# Patient Record
Sex: MALE
[De-identification: names, ages, dates, MRNs, and addresses within clinical notes are randomized per-mention and may not be internally consistent; named-entity substitution may affect disease eponyms.]

---

## 2023-07-26 ENCOUNTER — DASHBOARD ENCOUNTERS (OUTPATIENT)
Age: 88
End: 2023-07-26

## 2023-07-26 ENCOUNTER — OFFICE VISIT (OUTPATIENT)
Dept: URBAN - METROPOLITAN AREA CLINIC 115 | Facility: CLINIC | Age: 88
End: 2023-07-26
Payer: MEDICARE

## 2023-07-26 VITALS
HEART RATE: 62 BPM | DIASTOLIC BLOOD PRESSURE: 80 MMHG | SYSTOLIC BLOOD PRESSURE: 134 MMHG | TEMPERATURE: 99.1 F | WEIGHT: 184.2 LBS | HEIGHT: 72 IN | BODY MASS INDEX: 24.95 KG/M2

## 2023-07-26 DIAGNOSIS — Z80.0 FAMILY HISTORY OF RECTAL CANCER: ICD-10-CM

## 2023-07-26 DIAGNOSIS — R19.7 ACUTE DIARRHEA: ICD-10-CM

## 2023-07-26 PROBLEM — 62315008: Status: ACTIVE | Noted: 2023-07-26

## 2023-07-26 PROBLEM — 456441000124100: Status: ACTIVE | Noted: 2023-07-26

## 2023-07-26 PROCEDURE — 99244 OFF/OP CNSLTJ NEW/EST MOD 40: CPT | Performed by: INTERNAL MEDICINE

## 2023-07-26 PROCEDURE — 99204 OFFICE O/P NEW MOD 45 MIN: CPT | Performed by: INTERNAL MEDICINE

## 2023-07-26 RX ORDER — OXCARBAZEPINE 300 MG/1
1 TABLET TABLET, FILM COATED ORAL TWICE A DAY
Status: ACTIVE | COMMUNITY

## 2023-07-26 RX ORDER — LAMOTRIGINE 25 MG/1
1 TABLET TABLET ORAL
Status: ACTIVE | COMMUNITY

## 2023-07-26 RX ORDER — LISINOPRIL 40 MG/1
1 TABLET TABLET ORAL ONCE A DAY
Status: ACTIVE | COMMUNITY

## 2023-07-26 NOTE — HPI-TODAY'S VISIT:
RECENT LABS 5/22/23 MILD ANEMIA. BUN /CREAT NORMAL  SHCEDULED HERNIA SURGERY, LEFT INGUINAL , WENT FOR PREP OP HAD HTN, POSTPONED. STARTED ON LISINOPRIL,  DIARRHEA, MIXED WITH LIQUID, 2 MONTHS AGO, THIS MORNING IS REGULARE BM  H/O SEVERE DIARRHEA, ON JULY 7 TH.  ANUS DILATED , AND SEE TISSUES.   LAST COLONSOCOPY 1998.  NO ANTIBIOTICS IN LAST 3 MONTHS,  BP MEDICATION STARTED ON SECOND OF MICHELLE. , LOOSE BM EVEN BEFORE.   GOING TO HAVE BM , AND FEEL LIKE ALL OF [place]UDDEN CANT DO IT.   15 -20 EXPLODES  NO PAIN, NO BLEEDING.

## 2023-07-30 LAB
ADENOVIRUS F 40/41: NOT DETECTED
CAMPYLOBACTER: NOT DETECTED
CLOSTRIDIUM DIFFICILE: NOT DETECTED
ENTAMOEBA HISTOLYTICA: NOT DETECTED
ENTEROAGGREGATIVE E.COLI: NOT DETECTED
ENTEROTOXIGENIC E.COLI: NOT DETECTED
ESCHERICHIA COLI O157: NOT DETECTED
GIARDIA LAMBLIA: NOT DETECTED
NOROVIRUS GI/GII: NOT DETECTED
ROTAVIRUS A: NOT DETECTED
SALMONELLA SPP.: NOT DETECTED
SHIGA-LIKE TOXIN PRODUCING E.COLI: NOT DETECTED
SHIGELLA SPP. / ENTEROINVASIVE E.COLI: NOT DETECTED
VIBRIO PARAHAEMOLYTICUS: NOT DETECTED
VIBRIO SPP.: NOT DETECTED
YERSINIA ENTEROCOLITICA: NOT DETECTED

## 2023-08-14 ENCOUNTER — OFFICE VISIT (OUTPATIENT)
Dept: URBAN - METROPOLITAN AREA CLINIC 115 | Facility: CLINIC | Age: 88
End: 2023-08-14

## 2023-10-04 ENCOUNTER — OFFICE VISIT (OUTPATIENT)
Dept: URBAN - METROPOLITAN AREA CLINIC 115 | Facility: CLINIC | Age: 88
End: 2023-10-04

## 2023-11-08 ENCOUNTER — OFFICE VISIT (OUTPATIENT)
Dept: URBAN - METROPOLITAN AREA CLINIC 115 | Facility: CLINIC | Age: 88
End: 2023-11-08